# Patient Record
Sex: FEMALE | Race: WHITE | NOT HISPANIC OR LATINO | URBAN - METROPOLITAN AREA
[De-identification: names, ages, dates, MRNs, and addresses within clinical notes are randomized per-mention and may not be internally consistent; named-entity substitution may affect disease eponyms.]

---

## 2017-07-07 ENCOUNTER — INPATIENT (INPATIENT)
Facility: HOSPITAL | Age: 67
LOS: 1 days | Discharge: HOME | End: 2017-07-09
Attending: INTERNAL MEDICINE | Admitting: INTERNAL MEDICINE
Payer: MEDICARE

## 2017-07-07 VITALS
DIASTOLIC BLOOD PRESSURE: 66 MMHG | TEMPERATURE: 98 F | OXYGEN SATURATION: 100 % | HEART RATE: 63 BPM | SYSTOLIC BLOOD PRESSURE: 123 MMHG | HEIGHT: 66 IN | RESPIRATION RATE: 16 BRPM | WEIGHT: 139.99 LBS

## 2017-07-07 DIAGNOSIS — I83.899 VARICOSE VEINS OF UNSPECIFIED LOWER EXTREMITY WITH OTHER COMPLICATIONS: Chronic | ICD-10-CM

## 2017-07-07 LAB
APPEARANCE UR: CLEAR — SIGNIFICANT CHANGE UP
BACTERIA # UR AUTO: (no result)
BASOPHILS # BLD AUTO: 0.1 K/UL — SIGNIFICANT CHANGE UP (ref 0–0.2)
BASOPHILS NFR BLD AUTO: 1.5 % — SIGNIFICANT CHANGE UP (ref 0–2)
BILIRUB UR-MCNC: NEGATIVE — SIGNIFICANT CHANGE UP
COLOR SPEC: YELLOW — SIGNIFICANT CHANGE UP
DIFF PNL FLD: NEGATIVE — SIGNIFICANT CHANGE UP
EOSINOPHIL # BLD AUTO: 0.1 K/UL — SIGNIFICANT CHANGE UP (ref 0–0.5)
EOSINOPHIL NFR BLD AUTO: 1.9 % — SIGNIFICANT CHANGE UP (ref 0–6)
EPI CELLS # UR: SIGNIFICANT CHANGE UP
GLUCOSE UR QL: NEGATIVE MG/DL — SIGNIFICANT CHANGE UP
HCT VFR BLD CALC: 42.9 % — SIGNIFICANT CHANGE UP (ref 34.5–45)
HGB BLD-MCNC: 14.4 G/DL — SIGNIFICANT CHANGE UP (ref 11.5–15.5)
KETONES UR-MCNC: NEGATIVE — SIGNIFICANT CHANGE UP
LEUKOCYTE ESTERASE UR-ACNC: (no result)
LYMPHOCYTES # BLD AUTO: 1.7 K/UL — SIGNIFICANT CHANGE UP (ref 1–3.3)
LYMPHOCYTES # BLD AUTO: 28.1 % — SIGNIFICANT CHANGE UP (ref 13–44)
MCHC RBC-ENTMCNC: 28.2 PG — SIGNIFICANT CHANGE UP (ref 27–34)
MCHC RBC-ENTMCNC: 33.6 GM/DL — SIGNIFICANT CHANGE UP (ref 32–36)
MCV RBC AUTO: 83.8 FL — SIGNIFICANT CHANGE UP (ref 80–100)
MONOCYTES # BLD AUTO: 0.5 K/UL — SIGNIFICANT CHANGE UP (ref 0–0.9)
MONOCYTES NFR BLD AUTO: 8.7 % — SIGNIFICANT CHANGE UP (ref 2–14)
NEUTROPHILS # BLD AUTO: 3.6 K/UL — SIGNIFICANT CHANGE UP (ref 1.8–7.4)
NEUTROPHILS NFR BLD AUTO: 59.9 % — SIGNIFICANT CHANGE UP (ref 43–77)
NITRITE UR-MCNC: NEGATIVE — SIGNIFICANT CHANGE UP
PH UR: 5 — SIGNIFICANT CHANGE UP (ref 5–8)
PLATELET # BLD AUTO: 250 K/UL — SIGNIFICANT CHANGE UP (ref 150–400)
PROT UR-MCNC: NEGATIVE MG/DL — SIGNIFICANT CHANGE UP
RBC # BLD: 5.12 M/UL — SIGNIFICANT CHANGE UP (ref 3.8–5.2)
RBC # FLD: 12.6 % — SIGNIFICANT CHANGE UP (ref 10.3–14.5)
RBC CASTS # UR COMP ASSIST: NEGATIVE /HPF — SIGNIFICANT CHANGE UP (ref 0–4)
SP GR SPEC: 1.02 — SIGNIFICANT CHANGE UP (ref 1.01–1.02)
UROBILINOGEN FLD QL: NEGATIVE MG/DL — SIGNIFICANT CHANGE UP
WBC # BLD: 6 K/UL — SIGNIFICANT CHANGE UP (ref 3.8–10.5)
WBC # FLD AUTO: 6 K/UL — SIGNIFICANT CHANGE UP (ref 3.8–10.5)
WBC UR QL: SIGNIFICANT CHANGE UP

## 2017-07-07 PROCEDURE — 71010: CPT | Mod: 26

## 2017-07-07 PROCEDURE — 93010 ELECTROCARDIOGRAM REPORT: CPT

## 2017-07-07 PROCEDURE — 99285 EMERGENCY DEPT VISIT HI MDM: CPT

## 2017-07-07 RX ORDER — PANTOPRAZOLE SODIUM 20 MG/1
40 TABLET, DELAYED RELEASE ORAL DAILY
Qty: 0 | Refills: 0 | Status: DISCONTINUED | OUTPATIENT
Start: 2017-07-07 | End: 2017-07-09

## 2017-07-07 RX ORDER — EZETIMIBE 10 MG/1
10 TABLET ORAL DAILY
Qty: 0 | Refills: 0 | Status: DISCONTINUED | OUTPATIENT
Start: 2017-07-07 | End: 2017-07-09

## 2017-07-07 RX ORDER — HEPARIN SODIUM 5000 [USP'U]/ML
5000 INJECTION INTRAVENOUS; SUBCUTANEOUS EVERY 12 HOURS
Qty: 0 | Refills: 0 | Status: DISCONTINUED | OUTPATIENT
Start: 2017-07-07 | End: 2017-07-09

## 2017-07-07 RX ORDER — ACETAMINOPHEN 500 MG
650 TABLET ORAL EVERY 6 HOURS
Qty: 0 | Refills: 0 | Status: DISCONTINUED | OUTPATIENT
Start: 2017-07-07 | End: 2017-07-09

## 2017-07-07 RX ORDER — DOCUSATE SODIUM 100 MG
100 CAPSULE ORAL DAILY
Qty: 0 | Refills: 0 | Status: DISCONTINUED | OUTPATIENT
Start: 2017-07-07 | End: 2017-07-09

## 2017-07-07 RX ORDER — DIMETHYL FUMARATE 240 MG/1
120 CAPSULE ORAL
Qty: 0 | Refills: 0 | Status: DISCONTINUED | OUTPATIENT
Start: 2017-07-07 | End: 2017-07-09

## 2017-07-07 RX ADMIN — Medication 19.33 MILLIGRAM(S): at 18:16

## 2017-07-07 NOTE — H&P ADULT - NSHPREVIEWOFSYSTEMS_GEN_ALL_CORE
ICU Vital Signs Last 24 Hrs    T(F): 98.1 (07 Jul 2017 16:08), Max: 98.1 (07 Jul 2017 16:08)    HR: 63 (07 Jul 2017 16:08) (63 - 63)    BP: 123/66 (07 Jul 2017 16:08) (123/66 - 123/66)    RR: 16 (07 Jul 2017 16:08) (16 - 16)  SpO2: 100% (07 Jul 2017 16:08) (100% - 100%)

## 2017-07-07 NOTE — H&P ADULT - ASSESSMENT
Pt is admitted with    I. Multiple Sclerosis Exacerbation  - cont Solumedrol 1 gram infusion started in the ER,  Q day x3 days totals  - Neurology consult Dr. Renteria  - physical therapy    II. DVT prophylaxis  - heparin Pt is a 67 y/o woman with  Multiple Sclerosis, who is  sent in by Neurologist,  Dr. Cowan for admission and IV solumedrol treatment.   She has been taking Techfideria 120mg BID for the last 3 years for her MS.   Pt ambulates with a cane, however for 3 days her gait has been shuffling and she reports her legs feel  heavy .  She has had increased difficulty walking and getting up stairs for the past week.   She denies chest pain/SOB.  No resp or urinary symptoms.  Pt denied urinary or bowel incontinence.  She reports +chronic constipation.    Pt is admitted with    I. Multiple Sclerosis Exacerbation  - cont Solumedrol 1 gram infusion started in the ER,  Q day x3 days total, requested by Dr. Cowan, pts outpatient Neurologist  - will give daily protonix while on steroids  - Neurology consult Dr. Renteria  - physical therapy    II. Constipation  - colace    III. DVT prophylaxis  - heparin

## 2017-07-07 NOTE — ED STATDOCS - NEUROLOGICAL, MLM
sensation is normal and strength is normal. 4/5 motor bilateral lower extremities, decreased sensation over left lower extremity compared to right.

## 2017-07-07 NOTE — H&P ADULT - NSHPSOCIALHISTORY_GEN_ALL_CORE
Pt is  since 2001.  Her boyfriend is at bedside in the ER. She has a 45 pack year smoking history, she quit in 2013. Mo ETOH/drug use.

## 2017-07-07 NOTE — ED STATDOCS - PROGRESS NOTE DETAILS
65 yo female with a PMH of MS x 25 years presents with lower extremity weakness and decreased movement of the b/l lower legs x1 week. Pt used to take solu-medrol infusions every week for 10 years in the past. Was sent in by Dr. Cowan for admission for 3 days for solumedrol. Denies any other complaints. PMD= axax. -Irvin Stringer PA-C

## 2017-07-07 NOTE — ED ADULT NURSE NOTE - ED STAT RN HANDOFF DETAILS
Received report from PURNIMA Rodgers and reassessed patient. Agree with the previous RN assessment. Patient is awaiting admission orders from MD Estevez. Patient is sitting, spouse at bedside. No change in status or complaints at this time. Patient provided with coffee. Will continue to monitor/reassess.

## 2017-07-07 NOTE — ED ADULT NURSE NOTE - OBJECTIVE STATEMENT
pt received in [facility]. pt is awake alert oriented following commands and speaking coherently. presents to the ed for admission for IV solumedrol. resp even and unlabored. denies headache, nausea vomiting fever chills chest pain sob dizziness. ambulatory with steady gait. in no distress

## 2017-07-07 NOTE — H&P ADULT - HISTORY OF PRESENT ILLNESS
Pt is a 67 yo woman with  Multiple Sclerosis, had recent labs done with her PCP Dr. Best, sent in by Dr. Cowan for admission for IV solumedrol treatment. Pt has received IV solumedrol monthly for 10 years. Pt ambulates with a cane, has had worsened difficulty walking and getting up stairs for the past week. Denies urinary or bowel incontinence. +Chronic constipation. Pt is a 65 yo woman with  Multiple Sclerosis, who is  sent in by Neurologist,  Dr. Cowan for admission and IV solumedrol treatment.   Pt has received IV solumedrol monthly for 10 years. Pt ambulates with a cane, has had worsened difficulty walking and getting up stairs for the past week. Denies urinary or bowel incontinence. +Chronic constipation. Pt is a 67 y/o woman with  Multiple Sclerosis, who is  sent in by Neurologist,  Dr. Cowan for admission and IV solumedrol treatment.  She saw Dr Cowan today for difficulty ambulating.  She has been taking Techfideria 120mg BID for the last 3 years for her MS.   Pt ambulates with a cane, however for 3 days her gait has been shuffling and she reports her legs feel  heavy .  She has had increased difficulty walking and getting up stairs for the past week.   She denies chest pain/SOB.  No resp or urinary symptoms.  Pt denied urinary or bowel incontinence.  She reports +chronic constipation.    Fam hx:   Mother  at 88 of CHF  Father  at 101 of CVA

## 2017-07-07 NOTE — ED STATDOCS - OBJECTIVE STATEMENT
67 yo female h/o MS, had recent labs done with her PCP Dr. Best, sent in by Dr. Cowan for admission for IV solumedrol treatment. Pt has received IV solumedrol monthly for 10 years. Pt ambulates with a cane, has had worsened difficulty walking and getting up stairs for the past week. Denies urinary or bowel incontinence. +Chronic constipation.

## 2017-07-08 RX ADMIN — Medication 86 MILLIGRAM(S): at 05:29

## 2017-07-08 NOTE — PROGRESS NOTE ADULT - SUBJECTIVE AND OBJECTIVE BOX
CC-Patient is a 66y old  Female who presents with a chief complaint of weakness (2017 19:08)    HPI:  Pt is a 65 y/o woman with  Multiple Sclerosis, who is  sent in by Neurologist,  Dr. Cowan for admission and IV solumedrol treatment.  She saw Dr Cowan today for difficulty ambulating.  She has been taking Techfideria 120mg BID for the last 3 years for her MS.   Pt ambulates with a cane, however for 3 days her gait has been shuffling and she reports her legs feel  heavy .  She has had increased difficulty walking and getting up stairs for the past week.   She denies chest pain/SOB.  No resp or urinary symptoms.  Pt denied urinary or bowel incontinence.  She reports +chronic constipation.    17- doing good, ambulated    Review of system- All 10 systems reviewed and is as per HPI otherwise negative.     T(C): 36.8 (17 @ 07:07), Max: 36.8 (17 @ 07:07)  HR: 62 (17 @ 07:07) (62 - 67)  BP: 113/40 (17 @ 07:07) (107/43 - 132/38)  RR: 16 (17 @ 07:07) (15 - 16)  SpO2: 100% (17 @ 07:07) (97% - 100%)  Wt(kg): --    LABS:                        14.4   6.0   )-----------( 250      ( 2017 17:28 )             42.9     Urinalysis Basic - ( 2017 17:28 )  Color: Yellow / Appearance: Clear / S.020 / pH: x  Gluc: x / Ketone: Negative  / Bili: Negative / Urobili: Negative mg/dL   Blood: x / Protein: Negative mg/dL / Nitrite: Negative   Leuk Esterase: Moderate / RBC: Negative /HPF / WBC 3-5   Sq Epi: x / Non Sq Epi: Few / Bacteria: x    RADIOLOGY & ADDITIONAL TESTS:    PHYSICAL EXAM:  GENERAL: NAD, well-groomed, well-developed  HEAD:  Atraumatic, Normocephalic  EYES: EOMI, PERRLA, conjunctiva and sclera clear  HEENT: Moist mucous membranes  NECK: Supple, No JVD  NERVOUS SYSTEM:  Alert & Oriented X3, strength- 3/5 RLE/LLE  CHEST/LUNG: Clear to auscultation bilaterally; No rales, rhonchi, wheezing, or rubs  HEART: Regular rate and rhythm; No murmurs, rubs, or gallops  ABDOMEN: Soft, Nontender, Nondistended; Bowel sounds present  GENITOURINARY- Voiding, no palpable bladder  EXTREMITIES:  2+ Peripheral Pulses, No clubbing, cyanosis, or edema  MUSCULOSKELTAL- No muscle tenderness, Muscle tone normal, No joint tenderness, no Joint swelling, Joint range of motion-normal  SKIN-no rash, no lesion  CNS- alert, oriented X3, non focal     Daily Height in cm: 167.64 (2017 16:08)      heparin  Injectable 5000 Unit(s) SubCutaneous every 12 hours  acetaminophen   Tablet. 650 milliGRAM(s) Oral every 6 hours PRN  methylPREDNISolone sodium succinate IVPB 1000 milliGRAM(s) IV Intermittent daily  docusate sodium 100 milliGRAM(s) Oral daily  pantoprazole  Injectable 40 milliGRAM(s) IV Push daily  dimethyl fumarate DR Capsule 120 milliGRAM(s) Oral two times a day  ezetimibe 10 milliGRAM(s) Oral daily    Assessment/Plan  #Exacerbation of MS  Complete 3 days of IV pulse steroid therapy  Will discharge tomorrow after 1gm given on short course of tapering steroids  PT/ambulate  Outpatient f/u with DR Sanchez    #Dispo- likely home tomorrow

## 2017-07-08 NOTE — PHYSICAL THERAPY INITIAL EVALUATION ADULT - PERTINENT HX OF CURRENT PROBLEM, REHAB EVAL
66F with  Multiple Sclerosis, who is  sent in by Neurologist, + difficulty ambulating. 66F with  Multiple Sclerosis, who is sent in by Neurologist, + difficulty ambulating.

## 2017-07-09 VITALS
SYSTOLIC BLOOD PRESSURE: 107 MMHG | DIASTOLIC BLOOD PRESSURE: 45 MMHG | RESPIRATION RATE: 16 BRPM | TEMPERATURE: 98 F | HEART RATE: 61 BPM | OXYGEN SATURATION: 99 %

## 2017-07-09 RX ORDER — EZETIMIBE 10 MG/1
1 TABLET ORAL
Qty: 0 | Refills: 0 | COMMUNITY
Start: 2017-07-09

## 2017-07-09 RX ORDER — DIMETHYL FUMARATE 240 MG/1
1 CAPSULE ORAL
Qty: 0 | Refills: 0 | COMMUNITY
Start: 2017-07-09

## 2017-07-09 RX ADMIN — Medication 86 MILLIGRAM(S): at 05:25

## 2017-07-09 NOTE — DISCHARGE NOTE ADULT - CARE PLAN
Principal Discharge DX:	Multiple sclerosis exacerbation  Goal:	outpatient f/u  Instructions for follow-up, activity and diet:	outpatient f/u with DR Sanchez

## 2017-07-09 NOTE — DISCHARGE NOTE ADULT - HOSPITAL COURSE
CC- flare of MS- for pulse steroid therapy  HPI:  Pt is a 67 y/o woman with  Multiple Sclerosis, who is  sent in by Neurologist,  Dr. Cowan for admission and IV solumedrol treatment.  She saw Dr Cowan today for difficulty ambulating.  She has been taking Techfideria 120mg BID for the last 3 years for her MS.   Pt ambulates with a cane, however for 3 days her gait has been shuffling and she reports her legs feel  heavy .  She has had increased difficulty walking and getting up stairs for the past week.   She denies chest pain/SOB.  No resp or urinary symptoms.  Pt denied urinary or bowel incontinence.  She reports +chronic constipation.  Hospital course- received 3 days of pulse steroid therapy  Review of system- All 10 systems reviewed and is as per HPI otherwise negative.   T(C): 36.5 (17 @ 05:33), Max: 36.8 (17 @ 23:47)  HR: 61 (17 @ 05:33) (61 - 67)  BP: 107/45 (17 @ 05:33) (102/46 - 107/45)  RR: 16 (17 @ 05:33) (16 - 16)  SpO2: 99% (17 @ 05:33) (97% - 99%)  Wt(kg): --  LABS:                        14.4   6.0   )-----------( 250      ( 2017 17:28 )             42.9     Urinalysis Basic - ( 2017 17:28 )  Color: Yellow / Appearance: Clear / S.020 / pH: x  Gluc: x / Ketone: Negative  / Bili: Negative / Urobili: Negative mg/dL   Blood: x / Protein: Negative mg/dL / Nitrite: Negative   Leuk Esterase: Moderate / RBC: Negative /HPF / WBC 3-5   Sq Epi: x / Non Sq Epi: Few / Bacteria: x    PHYSICAL EXAM:  GENERAL: NAD, well-groomed, well-developed  HEAD:  Atraumatic, Normocephalic  EYES: EOMI, PERRLA, conjunctiva and sclera clear  HEENT: Moist mucous membranes  NECK: Supple, No JVD  NERVOUS SYSTEM:  Alert & Oriented X3, Motor Strength 4/5 both legs  CHEST/LUNG: Clear to auscultation bilaterally; No rales, rhonchi, wheezing, or rubs  HEART: Regular rate and rhythm; No murmurs, rubs, or gallops  ABDOMEN: Soft, Nontender, Nondistended; Bowel sounds present  GENITOURINARY- Voiding, no palpable bladder  EXTREMITIES:  2+ Peripheral Pulses, No clubbing, cyanosis, or edema  MUSCULOSKELTAL- No muscle tenderness, Muscle tone normal, No joint tenderness, no Joint swelling, Joint range of motion-normal  SKIN-no rash, no lesion  CNS- alert, oriented X3, non focal     heparin  Injectable 5000 Unit(s) SubCutaneous every 12 hours  acetaminophen   Tablet. 650 milliGRAM(s) Oral every 6 hours PRN  methylPREDNISolone sodium succinate IVPB 1000 milliGRAM(s) IV Intermittent daily  docusate sodium 100 milliGRAM(s) Oral daily  pantoprazole  Injectable 40 milliGRAM(s) IV Push daily  dimethyl fumarate DR Capsule 120 milliGRAM(s) Oral two times a day  ezetimibe 10 milliGRAM(s) Oral daily      Assessment/Plan  #Exacerbation of MS  Completed 3 days of pulse steroid therapy. Will discharge on short course tapering PO prednisone  Outpatient f/u with DR Cowan  #Hyperlipidemia- stable  #Dispo- home today

## 2017-07-09 NOTE — DISCHARGE NOTE ADULT - MEDICATION SUMMARY - MEDICATIONS TO TAKE
I will START or STAY ON the medications listed below when I get home from the hospital:    predniSONE 10 mg oral tablet  -- 4 tab(s) by mouth once a day  -- It is very important that you take or use this exactly as directed.  Do not skip doses or discontinue unless directed by your doctor.  Obtain medical advice before taking any non-prescription drugs as some may affect the action of this medication.  Take with food or milk.    -- Indication: For MS    ezetimibe 10 mg oral tablet  -- 1 tab(s) by mouth once a day  -- Indication: For cholesterol    dimethyl fumarate 120 mg oral delayed release capsule  -- 1 cap(s) by mouth 2 times a day  -- Indication: For MS

## 2017-07-12 DIAGNOSIS — R26.9 UNSPECIFIED ABNORMALITIES OF GAIT AND MOBILITY: ICD-10-CM

## 2017-07-12 DIAGNOSIS — E78.5 HYPERLIPIDEMIA, UNSPECIFIED: ICD-10-CM

## 2017-07-12 DIAGNOSIS — K59.00 CONSTIPATION, UNSPECIFIED: ICD-10-CM

## 2017-07-12 DIAGNOSIS — G35 MULTIPLE SCLEROSIS: ICD-10-CM

## 2017-07-12 DIAGNOSIS — M19.90 UNSPECIFIED OSTEOARTHRITIS, UNSPECIFIED SITE: ICD-10-CM

## 2021-01-11 NOTE — PATIENT PROFILE ADULT. - FUNCTIONAL SCREEN CURRENT LEVEL: TOILETING, MLM
Detail Level: Detailed Depth Of Biopsy: dermis Was A Bandage Applied: Yes Size Of Lesion In Cm: 0.4 X Size Of Lesion In Cm: 0 Biopsy Type: H and E Biopsy Method: Personna blade Anesthesia Type: 1% lidocaine with 1:200,000 epinephrine Anesthesia Volume In Cc (Will Not Render If 0): 0.5 Hemostasis: Aluminum Chloride Wound Care: Petrolatum Dressing: bandage Destruction After The Procedure: No Type Of Destruction Used: Curettage Curettage Text: The wound bed was treated with curettage after the biopsy was performed. Cryotherapy Text: The wound bed was treated with cryotherapy after the biopsy was performed. Electrodesiccation Text: The wound bed was treated with electrodesiccation after the biopsy was performed. Electrodesiccation And Curettage Text: The wound bed was treated with electrodesiccation and curettage after the biopsy was performed. Silver Nitrate Text: The wound bed was treated with silver nitrate after the biopsy was performed. Lab: 6 Lab Facility: 3 Consent: Written consent was obtained and risks were reviewed including but not limited to scarring, infection, bleeding, scabbing, incomplete removal, nerve damage and allergy to anesthesia. Post-Care Instructions: I reviewed with the patient in detail post-care instructions. Patient is to keep the biopsy clean with warm, soapy water, and then apply vaseline once daily until healed. Patient may apply hydrogen peroxide soaks to remove any crusting. Notification Instructions: Patient will be notified of biopsy results. However, patient instructed to call the office if not contacted within 2 weeks. Billing Type: Third-Party Bill Information: Selecting Yes will display possible errors in your note based on the variables you have selected. This validation is only offered as a suggestion for you. PLEASE NOTE THAT THE VALIDATION TEXT WILL BE REMOVED WHEN YOU FINALIZE YOUR NOTE. IF YOU WANT TO FAX A PRELIMINARY NOTE YOU WILL NEED TO TOGGLE THIS TO 'NO' IF YOU DO NOT WANT IT IN YOUR FAXED NOTE. (3) assistive equipment and person

## 2021-06-30 NOTE — PATIENT PROFILE ADULT. - NS PRO PT REFERRAL QUES 2 YN
If you're currently taking an anti-inflammatory such as advil, aleve, ibuprofen, diclofenac, naproxen, meloxicam, celebrex, or nabumetone, please stop. Take Medrol first for 6 days. This is a steroid pack. Flip the package over to the foil side and the directions will tell you to start with 6 pills the first day, 5 pills the second day, etc. Please do not take any other anti-inflammatories with the medrol dose daniel as this can upset your stomach. If something else is needed, you may take extra strength tylenol.      Once you are finished with the medrol, then you may re-start or start your anti-inflammatory: DICLOFENAC
no

## 2021-12-20 NOTE — PATIENT PROFILE ADULT. - PAIN SCALE PREFERRED, PROFILE
Additional Notes: Patient consent was obtained to proceed with the visit and recommended plan of care after discussion of all risks and benefits, including the risks of COVID-19 exposure.
Detail Level: Simple
numerical 0-10

## 2022-04-12 NOTE — PHYSICAL THERAPY INITIAL EVALUATION ADULT - ASSISTIVE DEVICE FOR TRANSFER: GAIT, REHAB EVAL
Detail Level: Zone Plan: 1. Continue Skin medicinals compound - apply a pea sized amount nightly to entire face\\n2. Cleanse with gentle cleanser daily recommended cerave cream to foam\\n3. Recommended La Roche Posay Ultra Soothing Toleriane\\n4. Recommended EltaMD tinted sunscreen if interested in camoflauge (samples provided)\\n5. Recommended IPL in the future handout given- deferred due to cost \\n6. Discontinue oils & mask or anything that is burning/stinging the face\\n7. Begin Targadox 50mg - two pills daily with food/water x1-2 months straight cane

## 2023-05-10 NOTE — H&P ADULT - NS NEC GEN PE MLT EXAM PC
Venipuncture Blood Specimen Collection  Venipuncture performed in L ARM by Pia Manzano MA with good hemostasis. Patient tolerated the procedure well without complications.   05/10/23   Pia Manzano MA    
detailed exam

## 2024-01-05 NOTE — ED ADULT NURSE NOTE - PERIPHERAL VASCULAR WDL
Chief Complaints and History of Present Illnesses   Patient presents with    Annual Eye Exam     Chief Complaint(s) and History of Present Illness(es)       Annual Eye Exam              Laterality: both eyes    Course: stable    Associated symptoms: floaters.  Negative for eye pain and flashes    Treatments tried: no treatments              Comments    Here for annual exam. VA has been fluctuating quite a bit. Stable floaters without flashes. No eye pain.     Diony HATFIELD 10:12 AM January 5, 2024                      
Pulses equal bilaterally, no edema present.